# Patient Record
Sex: MALE | Race: BLACK OR AFRICAN AMERICAN | NOT HISPANIC OR LATINO | URBAN - METROPOLITAN AREA
[De-identification: names, ages, dates, MRNs, and addresses within clinical notes are randomized per-mention and may not be internally consistent; named-entity substitution may affect disease eponyms.]

---

## 2019-04-30 ENCOUNTER — EMERGENCY (EMERGENCY)
Facility: HOSPITAL | Age: 49
LOS: 1 days | Discharge: HOME | End: 2019-04-30
Attending: EMERGENCY MEDICINE | Admitting: EMERGENCY MEDICINE
Payer: COMMERCIAL

## 2019-04-30 VITALS
HEART RATE: 76 BPM | DIASTOLIC BLOOD PRESSURE: 87 MMHG | SYSTOLIC BLOOD PRESSURE: 162 MMHG | OXYGEN SATURATION: 99 % | WEIGHT: 263.01 LBS | RESPIRATION RATE: 18 BRPM | TEMPERATURE: 98 F

## 2019-04-30 VITALS
OXYGEN SATURATION: 98 % | HEART RATE: 87 BPM | SYSTOLIC BLOOD PRESSURE: 159 MMHG | DIASTOLIC BLOOD PRESSURE: 90 MMHG | RESPIRATION RATE: 18 BRPM | TEMPERATURE: 98 F

## 2019-04-30 DIAGNOSIS — M54.5 LOW BACK PAIN: ICD-10-CM

## 2019-04-30 DIAGNOSIS — E11.9 TYPE 2 DIABETES MELLITUS WITHOUT COMPLICATIONS: ICD-10-CM

## 2019-04-30 DIAGNOSIS — E78.5 HYPERLIPIDEMIA, UNSPECIFIED: ICD-10-CM

## 2019-04-30 DIAGNOSIS — Z88.0 ALLERGY STATUS TO PENICILLIN: ICD-10-CM

## 2019-04-30 DIAGNOSIS — G89.29 OTHER CHRONIC PAIN: ICD-10-CM

## 2019-04-30 PROCEDURE — 99284 EMERGENCY DEPT VISIT MOD MDM: CPT

## 2019-04-30 PROCEDURE — 99284 EMERGENCY DEPT VISIT MOD MDM: CPT | Mod: 25

## 2019-04-30 PROCEDURE — 72131 CT LUMBAR SPINE W/O DYE: CPT

## 2019-04-30 PROCEDURE — 72131 CT LUMBAR SPINE W/O DYE: CPT | Mod: 26

## 2019-04-30 PROCEDURE — 96372 THER/PROPH/DIAG INJ SC/IM: CPT

## 2019-04-30 RX ORDER — KETOROLAC TROMETHAMINE 30 MG/ML
60 SYRINGE (ML) INJECTION ONCE
Qty: 0 | Refills: 0 | Status: DISCONTINUED | OUTPATIENT
Start: 2019-04-30 | End: 2019-04-30

## 2019-04-30 RX ORDER — DIAZEPAM 5 MG
2.5 TABLET ORAL ONCE
Qty: 0 | Refills: 0 | Status: DISCONTINUED | OUTPATIENT
Start: 2019-04-30 | End: 2019-04-30

## 2019-04-30 RX ORDER — METHOCARBAMOL 500 MG/1
2 TABLET, FILM COATED ORAL
Qty: 40 | Refills: 0 | OUTPATIENT
Start: 2019-04-30 | End: 2019-05-04

## 2019-04-30 RX ORDER — ONDANSETRON 8 MG/1
4 TABLET, FILM COATED ORAL ONCE
Qty: 0 | Refills: 0 | Status: COMPLETED | OUTPATIENT
Start: 2019-04-30 | End: 2019-04-30

## 2019-04-30 RX ORDER — METHOCARBAMOL 500 MG/1
2 TABLET, FILM COATED ORAL
Qty: 40 | Refills: 0
Start: 2019-04-30 | End: 2019-05-04

## 2019-04-30 RX ORDER — OXYCODONE AND ACETAMINOPHEN 5; 325 MG/1; MG/1
1 TABLET ORAL ONCE
Qty: 0 | Refills: 0 | Status: DISCONTINUED | OUTPATIENT
Start: 2019-04-30 | End: 2019-04-30

## 2019-04-30 RX ADMIN — OXYCODONE AND ACETAMINOPHEN 1 TABLET(S): 5; 325 TABLET ORAL at 17:47

## 2019-04-30 RX ADMIN — Medication 2.5 MILLIGRAM(S): at 16:23

## 2019-04-30 RX ADMIN — Medication 60 MILLIGRAM(S): at 16:23

## 2019-04-30 RX ADMIN — ONDANSETRON 4 MILLIGRAM(S): 8 TABLET, FILM COATED ORAL at 17:47

## 2019-04-30 NOTE — ED PROVIDER NOTE - CHPI ED SYMPTOMS NEG
no bladder dysfunction/no bowel dysfunction/no tingling/no neck tenderness/no numbness/no difficulty bearing weight/no motor function loss

## 2019-04-30 NOTE — ED PROVIDER NOTE - OBJECTIVE STATEMENT
47 y/o m with h/o DM, HLD, chronic back pain present to ED c/o lower back s/p fall two weeks ago. He state of [place] was raining. Patient state of being under the care of pain management receiving epidural steroid injections. He state he had a MRI prior to fall showing DJD. Patient state he has not been taking any pain meds because his percocet is not available until 5/4/19. Admit of not using any pain meds. Pain worsen with standing and sitting. He has been ambulating ever since. As per patient there was no form of incontinence. He was having a BM and that same time he felt a muscle spasm while trying to urinate and he then urinate outside the toilet. Denies fever, n, v, abd pain, dysuria, frequency, hematuria, paresis, paresthesia.

## 2019-04-30 NOTE — ED PROVIDER NOTE - CLINICAL SUMMARY MEDICAL DECISION MAKING FREE TEXT BOX
Patient with back pain and spasm s/p fall two weeks ago. Ct scan no ac fracture. Recommend f/u with PMD or pain management.

## 2019-04-30 NOTE — ED ADULT NURSE NOTE - CHPI ED NUR SYMPTOMS NEG
no numbness/no motor function loss/no neck tenderness/no anorexia/no fatigue/no tingling/no constipation

## 2019-04-30 NOTE — ED PROVIDER NOTE - ATTENDING CONTRIBUTION TO CARE
48 M acute on chronic back pain 'spasm' since fall 2 weeks ago.  Denies incontinence or retention (despite triage - pt states just didn't make it on time to urinate).  No weakness, numbness, tingling.  Ambulatory.  Pt mild lumbar ttp.  NVI.  Nl DTRs b/l, 5/5 bl strength, EHL/FHL intact, no saddle anesthesia, nl ambulation.  CT nl.  Tx and pain improved.  Plan dc and outpt fu with pain management.

## 2019-04-30 NOTE — ED ADULT NURSE NOTE - OBJECTIVE STATEMENT
Pt BIBA w/ PMH of osteoarthritis on corticosteroid injections q2 months presents to ED today s/p mechanical slip and fall [place].  Pt elicits pain has progressively gotten worse and today had x1 episode of loss of bladder and x1 episode of loss of bowel, and thus presents to ED for evaluation.  Pt is CMS intact in BLE.  Pt is able to bear weight w/ difficulty.  Pt denies LOC or head injury.  Pt denies taking any pain medication today.

## 2019-04-30 NOTE — ED ADULT TRIAGE NOTE - CHIEF COMPLAINT QUOTE
Pt presents c/o 9/10 lower back pain s/p mechanical trip and fall x2 weeks ago.  Pt denies LOC or head injury.  Pt elicits x1 episode of loss of bladder and x1 episode of loss of bowel today.  Pt denies txs for pain.  Pt is on corticosteroid injections for spinal osteoarthritis.

## 2023-08-13 ENCOUNTER — EMERGENCY (EMERGENCY)
Facility: HOSPITAL | Age: 53
LOS: 1 days | Discharge: HOME | End: 2023-08-13
Admitting: EMERGENCY MEDICINE
Payer: MEDICARE

## 2023-08-13 VITALS
DIASTOLIC BLOOD PRESSURE: 74 MMHG | SYSTOLIC BLOOD PRESSURE: 138 MMHG | HEART RATE: 74 BPM | OXYGEN SATURATION: 100 % | RESPIRATION RATE: 16 BRPM | TEMPERATURE: 98 F

## 2023-08-13 VITALS
HEIGHT: 66 IN | DIASTOLIC BLOOD PRESSURE: 98 MMHG | WEIGHT: 255.96 LBS | OXYGEN SATURATION: 99 % | RESPIRATION RATE: 18 BRPM | TEMPERATURE: 99 F | SYSTOLIC BLOOD PRESSURE: 166 MMHG | HEART RATE: 70 BPM

## 2023-08-13 PROCEDURE — 99283 EMERGENCY DEPT VISIT LOW MDM: CPT

## 2023-08-13 PROCEDURE — 99284 EMERGENCY DEPT VISIT MOD MDM: CPT

## 2023-08-13 RX ORDER — OXYCODONE AND ACETAMINOPHEN 5; 325 MG/1; MG/1
1 TABLET ORAL
Qty: 10 | Refills: 0
Start: 2023-08-13

## 2023-08-13 NOTE — ED PROVIDER NOTE - PATIENT PORTAL LINK FT
You can access the FollowMyHealth Patient Portal offered by [facility] by registering at the following website: [email or web address]. By joining [employer]’s FollowMyHealth portal, you will also be able to view your health information using other applications (apps) compatible with our system.

## 2023-08-13 NOTE — ED PROVIDER NOTE - NEUROLOGICAL, MLM
Have You Had Fillers Before?: has not had fillers Alert and oriented, no focal deficits, no motor or sensory deficits. strength 5/5 b/l upper and lower ext, sensation intact distally b/l upper and lower ext

## 2023-08-13 NOTE — ED PROVIDER NOTE - OBJECTIVE STATEMENT
52 y/o m hx chronic low back pain presents stating he ran out of percocet and has follow up with a new pain management but won't be seen until next week.  Pt reports typical low back pain consistent with his chronic pain.  Pt denies numbness/tingling to ext, weakness, saddle anesthesia, bowel/bladder incontinence, all other ROS negative.

## 2023-08-13 NOTE — ED ADULT NURSE NOTE - NSFALLHARMRISKINTERV_ED_ALL_ED

## 2023-08-13 NOTE — ED ADULT NURSE NOTE - OBJECTIVE STATEMENT
Pt presents to ED c/o chronic back pain for 2 years. Reports he ran out of pain medication in June and has been taking 500mg of ibuprofen twice a day until he can see his new PCP on Friday 8/18. Denies numbness/tingling, NVD, CP, SOB, headache and dizziness.

## 2023-08-13 NOTE — ED PROVIDER NOTE - CLINICAL SUMMARY MEDICAL DECISION MAKING FREE TEXT BOX
54 yo m presents  reporting chronic low back pain, ran out of pain meds.  No neuro deficits, pt given a dose in ED, per [facility], pt with no active rx, given rx for 10 tabs, will f/u pain management this week.

## 2023-08-13 NOTE — ED ADULT TRIAGE NOTE - CHIEF COMPLAINT QUOTE
pt c/o right  lower back pain and left ankle pain x 2 years. pt stated " he had a pain management dr, but now they don't take the insurance and he has not been able to see any one else"

## 2023-08-14 DIAGNOSIS — Z88.0 ALLERGY STATUS TO PENICILLIN: ICD-10-CM

## 2023-08-14 DIAGNOSIS — M54.50 LOW BACK PAIN, UNSPECIFIED: ICD-10-CM

## 2023-08-14 DIAGNOSIS — G89.29 OTHER CHRONIC PAIN: ICD-10-CM

## 2023-08-14 PROBLEM — M47.9 SPONDYLOSIS, UNSPECIFIED: Chronic | Status: ACTIVE | Noted: 2019-04-30

## 2023-09-04 ENCOUNTER — EMERGENCY (EMERGENCY)
Facility: HOSPITAL | Age: 53
LOS: 1 days | Discharge: HOME | End: 2023-09-04
Admitting: EMERGENCY MEDICINE
Payer: MEDICARE

## 2023-09-04 VITALS
WEIGHT: 250 LBS | OXYGEN SATURATION: 97 % | SYSTOLIC BLOOD PRESSURE: 139 MMHG | HEART RATE: 79 BPM | RESPIRATION RATE: 16 BRPM | TEMPERATURE: 98 F | HEIGHT: 66 IN | DIASTOLIC BLOOD PRESSURE: 89 MMHG

## 2023-09-04 PROCEDURE — 99283 EMERGENCY DEPT VISIT LOW MDM: CPT

## 2023-09-04 RX ORDER — IBUPROFEN 200 MG
600 TABLET ORAL ONCE
Refills: 0 | Status: COMPLETED | OUTPATIENT
Start: 2023-09-04 | End: 2023-09-04

## 2023-09-04 RX ORDER — IBUPROFEN 200 MG
1 TABLET ORAL
Qty: 15 | Refills: 0
Start: 2023-09-04 | End: 2023-09-08

## 2023-09-04 RX ADMIN — Medication 600 MILLIGRAM(S): at 21:01

## 2023-09-04 RX ADMIN — Medication 300 MILLIGRAM(S): at 21:01

## 2023-09-04 NOTE — ED PROVIDER NOTE - CLINICAL SUMMARY MEDICAL DECISION MAKING FREE TEXT BOX
pt c/o L lower toothache x few wks, no acute changes in symptoms today, has not seen dentist, on exam poor dentition w/some impacted and missing teeth, no facial swelling, no cervical lymphadenopathy, no abscess, will cover w/abx, dental f/u list given, soft diet advised, pt understands and agrees w/plan, strict return precautions given

## 2023-09-04 NOTE — ED ADULT TRIAGE NOTE - CHIEF COMPLAINT QUOTE
Pt c/o L lower tooth pain x 1 week, "I feel like it is loose". Denies radiating pain, fevers. PMH HTN, DM

## 2023-09-04 NOTE — ED PROVIDER NOTE - OBJECTIVE STATEMENT
The pt is a 54 y/o M, who presents to ED c/o L lower toothache x 2 wks. Pt states "my breath is bad", pain is constant and dull, 8/10, taking ibuprofen w/some relief, has not seen dentist and does not have a dentist. Denies trauma, swelling, pus, bleeding, jaw pain, fevers, chills.

## 2023-09-04 NOTE — ED PROVIDER NOTE - PATIENT PORTAL LINK FT
You can access the FollowMyHealth Patient Portal offered by [facility] by registering at the following website: [email or web address]. By joining [employer]’s FollowMyHealth portal, you will also be able to view your health information using other applications (apps) compatible with our system.

## 2023-09-04 NOTE — ED PROVIDER NOTE - ENMT, MLM
Airway patent, Nasal mucosa clear. Mouth with poor dentition, missing teeth. + impacted L lower molar, no swelling, no pus, no bleeding, no trismus. Throat has no vesicles, no oropharyngeal exudates and uvula is midline.

## 2023-09-04 NOTE — ED PROVIDER NOTE - NSFOLLOWUPINSTRUCTIONS_ED_ALL_ED_FT
Dental Pain  take the antibiotics, ibuprofen as needed for pain, follow up with dental clinic - see list  Dental pain (toothache) may be caused by many things including tooth decay (cavities or caries), abscess or infection, or trauma. If you were prescribed an antibiotic medicine, finish all of it even if you start to feel better. Rinsing your mouth with salt water or applying ice to the painful area of your face may help with the pain. Follow up with a dentist is important in ensuring good oral health and preventing the worsening of dental disease.    SEEK IMMEDIATE MEDICAL CARE IF YOU HAVE ANY OF THE FOLLOWING SYMPTOMS: unable to open your mouth, trouble breathing or swallowing, fever, or swelling of the face, neck, or jaw.

## 2023-09-04 NOTE — ED ADULT NURSE NOTE - OBJECTIVE STATEMENT
53y male presents to ED c/o left lower dental pain. Pt has been using mouthwash without relief. Managing secretions. A&Ox4.

## 2023-09-06 DIAGNOSIS — K01.1 IMPACTED TEETH: ICD-10-CM

## 2023-09-06 DIAGNOSIS — K08.89 OTHER SPECIFIED DISORDERS OF TEETH AND SUPPORTING STRUCTURES: ICD-10-CM

## 2023-09-06 DIAGNOSIS — Z88.0 ALLERGY STATUS TO PENICILLIN: ICD-10-CM

## 2024-09-06 NOTE — ED ADULT NURSE NOTE - CHIEF COMPLAINT QUOTE
Done   Pt c/o L lower tooth pain x 1 week, "I feel like it is loose". Denies radiating pain, fevers. PMH HTN, DM

## 2024-11-04 NOTE — ED PROVIDER NOTE - DURATION
-- DO NOT REPLY / DO NOT REPLY ALL --  -- This inbox is not monitored. If this was sent to the wrong provider or department, reroute message to P [facility] Reroute pool. --  -- Message is from [facility] [employer] ([facility]) --    Offered Waitlist if Available for the Visit Type? Yes    Caller is requesting an appointment.    Reason for Appointment Message:  Clinician Schedule is unreleased    Reason for Visit: annual physical and only wants to see Dr Washington    Is the patient currently scheduled? No    Preferred time to be seen: as soon as possible, pcp schedule still closed    Caller Information       Contact Date/Time Type Contact Phone/Fax    11/04/2024 03:14 PM CST Phone (Incoming) Mildred uLdwig (Self) [phone] (M)            Alternative phone number: [phone]    Can a detailed message be left?  Yes - Voicemail   Patient has been advised the message will be addressed within 2-3 business days    -- DO NOT REPLY / DO NOT REPLY ALL --  -- This inbox is not monitored. If this was sent to the wrong provider or department, reroute message to P [facility] Reroute pool. --  -- Message is from [facility] [employer] ([facility]) --    Request Result      Which Result are your requesting?  Blood wokr     What is the full name of the provider that ordered the lab or test?: deshawn washington     [facility] site name: [email or web address]     Caller Information       Contact Date/Time Type Contact Phone/Fax    11/04/2024 03:14 PM CST Phone (Incoming) Mildred Ludwig (Self) [phone] (M)            Alternative phone number: [phone]    Can a detailed message be left?: Yes - Voicemail     Patient has been advised the message will be addressed within 2-3 business days.            week(s)